# Patient Record
(demographics unavailable — no encounter records)

---

## 2025-03-06 NOTE — DISCUSSION/SUMMARY
[Normal Growth] : growth [Normal Development] : development  [No Elimination Concerns] : elimination [No Skin Concerns] : skin [Normal Sleep Pattern] : sleep [None] : no medical problems [Anticipatory Guidance Given] : Anticipatory guidance addressed as per the history of present illness section [Physical Growth and Development] : physical growth and development [Social and Academic Competence] : social and academic competence [Emotional Well-Being] : emotional well-being [Risk Reduction] : risk reduction [Violence and Injury Prevention] : violence and injury prevention [Patient] : patient [No Medications] : ~He/She~ is not on any medications [Parent/Guardian] : Parent/Guardian [Full Activity without restrictions including Physical Education & Athletics] : Full Activity without restrictions including Physical Education & Athletics [de-identified] : eat more veggies, eliminate sugary drinks [FreeTextEntry6] : declined hpv /flu  [de-identified] : referred to behavioral health appointment made with GERRY, discussed with patient and father [FreeTextEntry1] : 12 y/o female here for wcc.  dad declined hpv and flu vac  Growing and developing normally.  discussed healthy eating, minimize sugary drinks  blood work ordered for fasting  passed hearing and vision  phq9 failed set up appt with GERRY, gave pt resources for behavioral urgent care as well  pt doing better in the past 6 weeks since cutting episode, no longer abusing substances since last year  rtc for well check  rtc for fasting blood work  appt with [facility] behavioral health made

## 2025-03-06 NOTE — PHYSICAL EXAM
[Alert] : alert [No Acute Distress] : no acute distress [Normocephalic] : normocephalic [EOMI Bilateral] : EOMI bilateral [Clear tympanic membranes with bony landmarks and light reflex present bilaterally] : clear tympanic membranes with bony landmarks and light reflex present bilaterally  [Pink Nasal Mucosa] : pink nasal mucosa [Nonerythematous Oropharynx] : nonerythematous oropharynx [Supple, full passive range of motion] : supple, full passive range of motion [No Palpable Masses] : no palpable masses [Regular Rate and Rhythm] : regular rate and rhythm [Clear to Auscultation Bilaterally] : clear to auscultation bilaterally [Normal S1, S2 audible] : normal S1, S2 audible [No Murmurs] : no murmurs [+2 Femoral Pulses] : +2 femoral pulses [Soft] : soft [NonTender] : non tender [Non Distended] : non distended [Normoactive Bowel Sounds] : normoactive bowel sounds [No Hepatomegaly] : no hepatomegaly [No Splenomegaly] : no splenomegaly [Gopal: ____] : Gopal [unfilled] [Gopal: _____] : Gopal [unfilled] [No Abnormal Lymph Nodes Palpated] : no abnormal lymph nodes palpated [Normal Muscle Tone] : normal muscle tone [No Gait Asymmetry] : no gait asymmetry [No pain or deformities with palpation of bone, muscles, joints] : no pain or deformities with palpation of bone, muscles, joints [Straight] : straight [+2 Patella DTR] : +2 patella DTR [Cranial Nerves Grossly Intact] : cranial nerves grossly intact [No Rash or Lesions] : no rash or lesions

## 2025-03-06 NOTE — HISTORY OF PRESENT ILLNESS
[Father] : father [Yes] : Patient goes to dentist yearly [Eats meals with family] : eats meals with family [Has family members/adults to turn to for help] : has family members/adults to turn to for help [Is permitted and is able to make independent decisions] : Is permitted and is able to make independent decisions [Sleep Concerns] : no sleep concerns [Grade: ____] : Grade: [unfilled] [Eats regular meals including adequate fruits and vegetables] : does not eat regular meals including adequate fruits and vegetables [Drinks non-sweetened liquids] : does not drink non-sweetened liquids  [Calcium source] : calcium source [Has concerns about body or appearance] : does not have concerns about body or appearance [Has friends] : has friends [At least 1 hour of physical activity a day] : at least 1 hour of physical activity a day [Screen time (except homework) less than 2 hours a day] : screen time (except homework) less than 2 hours a day [Has interests/participates in community activities/volunteers] : has interests/participates in community activities/volunteers. [Uses electronic nicotine delivery system] : does not use electronic nicotine delivery system [Uses tobacco] : does not use tobacco [Uses drugs] : does not use drugs  [Drinks alcohol] : does not drink alcohol [No] : No cigarette smoke exposure [Uses safety belts/safety equipment] : uses safety belts/safety equipment  [Impaired/distracted driving] : no impaired/distracted driving [Has peer relationships free of violence] : has peer relationships free of violence [Displays self-confidence] : displays self-confidence [Gets depressed, anxious, or irritable/has mood swings] : gets depressed, anxious, or irritable/has mood swings [Has problems with sleep] : does not have problems with sleep [Has thought about hurting self or considered suicide] : has thought about hurting self or considered suicide [FreeTextEntry7] :  No updates from last check up. No hospitalizations, no ED visits. Pt has been depressed, 6 weeks ago was cutting, family working on getting pt psychologist/therapist, reached out to school suicide prevention team, [facility] was called to family's house,  [de-identified] : depression [de-identified] : dad declined flu and hpv shot [FreeTextEntry8] : period skips some months, some periods light , others heavy , pt does not want this evaluated, as she will not go on meds as her family does not believe in medication [de-identified] : lives with dad, coexists with brother, has a boyfriend and one best friend, skips breakfast and lunch, drinks sugary iced tea, no veggies [de-identified] : [employer] grade 8 , goes on walks with friend, likes art  [de-identified] : pt states she used vape and weed pen last year but has not used in over a year [de-identified] : has had once with long term bf, but once parents found out has not with her boyfriend since, but they are still together  [de-identified] : did cut self 6 weeks ago, acs involved parents working on getting therapy and psy , school resources used but they called acs so pt feels she can't turn to school counselor etc.  [FreeTextEntry1] : 14 y/o female here for wcc.  has struggled with mental health the past year  was using substances vape/canabis pen  but stopped last year  was cutting a few weeks ago, acs , family involved, getting therapist /psychology